# Patient Record
Sex: MALE | Race: WHITE
[De-identification: names, ages, dates, MRNs, and addresses within clinical notes are randomized per-mention and may not be internally consistent; named-entity substitution may affect disease eponyms.]

---

## 2020-11-16 ENCOUNTER — HOSPITAL ENCOUNTER (EMERGENCY)
Dept: HOSPITAL 97 - ER | Age: 60
Discharge: HOME | End: 2020-11-16
Payer: COMMERCIAL

## 2020-11-16 VITALS — SYSTOLIC BLOOD PRESSURE: 132 MMHG | DIASTOLIC BLOOD PRESSURE: 70 MMHG | OXYGEN SATURATION: 100 %

## 2020-11-16 VITALS — TEMPERATURE: 98.2 F

## 2020-11-16 DIAGNOSIS — M62.830: Primary | ICD-10-CM

## 2020-11-16 PROCEDURE — 96372 THER/PROPH/DIAG INJ SC/IM: CPT

## 2020-11-16 PROCEDURE — 99283 EMERGENCY DEPT VISIT LOW MDM: CPT

## 2020-11-16 NOTE — ER
Nurse's Notes                                                                                     

 [facility]                                                                 

Name: Yosef Pickard                                                                                

Age: 60 yrs                                                                                       

Sex: Male                                                                                         

: 1960                                                                                   

MRN: [id number]                                                                                   

Arrival Date: 2020                                                                          

Time: 08:34                                                                                       

Account#: [id number]                                                                            

Bed 8                                                                                             

Private MD:                                                                                       

Diagnosis: Low back pain;Muscle spasm of back                                                     

                                                                                                  

Presentation:                                                                                     

                                                                                             

08:50 Chief complaint: Patient states: reports of left lower back pain that started           vg1 

      yesterday, 11/15/2020.                                                                      

08:50 Method Of Arrival: Ambulatory                                                           vg1 

08:50 Coronavirus screen: Client denies travel out of the [place] in the last 14 days. Ebola     vg1 

      Screen: Patient negative for fever greater than or equal to 101.5 degrees Fahrenheit,       

      and additional compatible Ebola Virus Disease symptoms. Initial Sepsis Screen: Does the     

      patient meet any 2 criteria? No. Patient's initial sepsis screen is negative. Does the      

      patient have a suspected source of infection? No. Patient's initial sepsis screen is        

      negative. Risk Assessment: Do you want to hurt yourself or someone else? Patient            

      reports no desire to harm self or others. Onset of symptoms was November 15, 2020.          

08:50 Acuity: JARETT 4                                                                           vg1 

                                                                                                  

- Family history:: not pertinent.                                                                 

- Social history:: Smoking status: Patient denies any tobacco usage or history of.                

- Hospitalizations: : No recent hospitalization is reported.                                      

                                                                                                  

                                                                                                  

Screenin:55 Abuse screen: Denies threats or abuse. Nutritional screening: No deficits noted.        vg1 

      Tuberculosis screening: No symptoms or risk factors identified. Fall Risk No fall in        

      past 12 months (0 pts). No secondary diagnosis (0 pts). No IV (0 pts). Ambulatory Aid-      

      None/Bed Rest/Nurse Assist (0 pts). Gait- Normal/Bed Rest/Wheelchair (0 pts) Mental         

      Status- Oriented to own ability (0 pts). Total Queen Fall Scale indicates No Risk (0-24     

      pts).                                                                                       

                                                                                                  

Assessment:                                                                                       

08:55 General: Appears in no apparent distress. Behavior is calm, cooperative. Pain:          vg1 

      Complains of pain in Left lower back Pain currently is 5 out of 10 on a pain scale. at      

      worst was 8 out of 10 on a pain scale. Pain began 1 day ago. Is continuous. Neuro:          

      Level of Consciousness is awake, alert, obeys commands, Oriented to person, place,          

      time, situation. Cardiovascular: Patient's skin is warm and dry. Respiratory: Airway is     

      patent Respiratory effort is even, unlabored, Respiratory pattern is regular,               

      symmetrical. GI: No signs and/or symptoms were reported involving the gastrointestinal      

      system. : No signs and/or symptoms were reported regarding the genitourinary system.      

      EENT: No signs and/or symptoms were reported regarding the EENT system. Derm: Skin is       

      pink, warm \T\ dry. Musculoskeletal: Range of motion: intact in all extremities.            

                                                                                                  

Vital Signs:                                                                                      

09:00  / 82; Pulse 50; Resp 18; Temp 98.2(TE); Pulse Ox 95% on R/A;                     vg1 

09:30  / 70; Pulse 53; Resp 18; Pulse Ox 100% on R/A;                                   vg1 

09:00 Patient stated 'I've always had a low heart rate'.                                      vg1 

                                                                                                  

ED Course:                                                                                        

08:34 Patient arrived in ED.                                                                  ds1 

08:55 Stephy Dasilva, RN is Primary Nurse.                                                  vg1 

08:55 Patient has correct armband on for positive identification. Placed in gown. Bed in low  vg1 

      position. Call light in reach. Pulse ox on. NIBP on. Door closed. Warm blanket given.       

08:58 Elbert Velasquez MD is Attending Physician.                                                rn  

09:15 Triage completed.                                                                       vg1 

09:36 No provider procedures requiring assistance completed. Patient did not have IV access   vg1 

      during this emergency room visit.                                                           

                                                                                                  

Administered Medications:                                                                         

09:18 CANCELLED (Physician Discretion): Decadron - Dexamethasone 10 mg IVP once               vg1 

09:34 Drug: Decadron 10 mg Route: IM; Site: Ventrogluteal RIGHT;                              vg1 

09:35 Follow up: Response: Medication administered at discharge.                              vg1 

                                                                                                  

                                                                                                  

Outcome:                                                                                          

09:10 Discharge ordered by MD.                                                                rn  

09:36 Discharged to home ambulatory.                                                          vg1 

09:36 Condition: stable                                                                           

09:36 Discharge instructions given to patient, Instructed on discharge instructions, follow       

      up and referral plans. medication usage, Demonstrated understanding of instructions,        

      follow-up care, medications, Prescriptions given X 2.                                       

09:37 Patient left the ED.                                                                    vg1 

                                                                                                  

Signatures:                                                                                       

Amparo Handy                                ds1                                                  

Elbert Velasquez MD MD rn Garcia, Victoria, RN RN   vg1                                                  

                                                                                                  

Corrections: (The following items were deleted from the chart)                                    

09:10 09:00  / 82; Pulse 50bpm; Resp 18bpm; Pulse Ox 95% RA; Patient stated 'I've       vg1 

      always had a low heart rate'.; vg1                                                          

                                                                                                  

**************************************************************************************************

## 2020-11-16 NOTE — EDPHYS
Physician Documentation                                                                           

 [facility]                                                                 

Name: Yosef Pickard                                                                                

Age: 60 yrs                                                                                       

Sex: Male                                                                                         

: 1960                                                                                   

MRN: [id number]                                                                                   

Arrival Date: 2020                                                                          

Time: 08:34                                                                                       

Account#: [id number]                                                                            

Bed 8                                                                                             

Private MD:                                                                                       

ED Physician Elbert Velasquez                                                                         

HPI:                                                                                              

                                                                                             

09:03 This 60 yrs old  Male presents to ER via Unassigned with complaints of Back    rn  

      Pain.                                                                                       

09:03 The patient presents with pain that is chronic, with no known mechanism of injury. The  rn  

      symptoms are located in the low back.                                                       

09:03 Onset: The symptoms/episode began/occurred yesterday. The pain does not radiate.        rn  

      Associated signs and symptoms: Pertinent positives: none Pertinent negatives: abdominal     

      pain, dysuria, hematuria, incontinence, nausea, numbness, tingling, urinary retention,      

      vomiting, weakness. The problem was sustained without known cause. Modifying factors:       

      The patient symptoms are alleviated by remaining still, the patient symptoms are            

      aggravated by any movement, bending. Severity of symptoms: At their worst the symptoms      

      were moderate, in the emergency department the symptoms are unchanged. The patient has      

      experienced similar episodes in the past. The patient has not recently seen a               

      physician. Reports back pain, no acute injury, has had back pain since , no surgery,     

      reports intermittent back problems, improves with steroid injections and muscle             

      relaxers. No bowel/bladder issues. no weakness of legs. + left lower back pain. .           

                                                                                                  

- Family history:: not pertinent.                                                                 

- Social history:: Smoking status: Patient denies any tobacco usage or history of.                

- Hospitalizations: : No recent hospitalization is reported.                                      

                                                                                                  

                                                                                                  

ROS:                                                                                              

09:03 Constitutional: Negative for fever, chills, and weight loss, Abdomen/GI: Negative for   rn  

      abdominal pain, nausea, vomiting, diarrhea, and constipation, Back: Negative for            

      injury. + pain to left lower back : Negative for injury, bleeding, discharge, and         

      swelling, MS/Extremity: Negative for injury and deformity, Neuro: Negative for              

      headache, weakness, numbness, tingling, and seizure.                                        

                                                                                                  

Exam:                                                                                             

09:03 Constitutional:  This is a well developed, well nourished patient who is awake, alert,  rn  

      and in no acute distress. Back:  No spinal tenderness. + mild tenderness left lower         

      back. No skin changes.  Skin:  Warm, dry MS/ Extremity:  Pulses equal, no cyanosis.         

      Neurovascular intact.  Neuro:  Awake and alert,  Motor strength 5/5 in all extremities.     

       Sensory grossly intact.                                                                    

                                                                                                  

Vital Signs:                                                                                      

09:00  / 82; Pulse 50; Resp 18; Temp 98.2(TE); Pulse Ox 95% on R/A;                     vg1 

09:30  / 70; Pulse 53; Resp 18; Pulse Ox 100% on R/A;                                   vg1 

09:00 Patient stated 'I've always had a low heart rate'.                                      vg1 

                                                                                                  

MDM:                                                                                              

08:58 Patient medically screened.                                                             rn  

09:03 Differential diagnosis: chronic back pain, muscle spasm, radiculopathy. Data reviewed:  rn  

      vital signs, nurses notes, and as a result, I will discharge patient. Counseling: I had     

      a detailed discussion with the patient and/or guardian regarding: the historical            

      points, exam findings, and any diagnostic results supporting the discharge/admit            

      diagnosis, the need for outpatient follow up, to return to the emergency department if      

      symptoms worsen or persist or if there are any questions or concerns that arise at home.    

                                                                                                  

Administered Medications:                                                                         

:18 CANCELLED (Physician Discretion): Decadron - Dexamethasone 10 mg IVP once               vg1 

09:34 Drug: Decadron 10 mg Route: IM; Site: Ventrogluteal RIGHT;                              vg1 

09:35 Follow up: Response: Medication administered at discharge.                              vg1 

                                                                                                  

                                                                                                  

Disposition:                                                                                      

20 09:10 Discharged to Home. Impression: Low back pain, Muscle spasm of back.               

- Condition is Stable.                                                                            

- Discharge Instructions: Back Pain, Adult, Muscle Cramps and Spasms, Back Injury                 

  Prevention, Easy-to-Read, Back Exercises, Easy-to-Read.                                         

- Prescriptions for Cyclobenzaprine 10 mg Oral Tablet - take 1 tablet by ORAL route               

  every 8 hours As needed; 15 tablet. Medrol (William) 4 mg Oral Tablets, Dose Pack - take            

  1 tablet by ORAL route as directed - follow package instructions; 1 packet.                     

- Medication Reconciliation Form, Thank You Letter, Antibiotic Education, Prescription            

  Opioid Use form.                                                                                

- Follow up: Private Physician; When: As needed; Reason: Recheck today's complaints,              

  Re-evaluation by your physician.                                                                

- Problem is an acute exacerbation.                                                               

- Symptoms have improved.                                                                         

                                                                                                  

                                                                                                  

                                                                                                  

Signatures:                                                                                       

Elbert Velasquez MD MD rn Garcia, Victoria, RN                    RN   vg1                                                  

                                                                                                  

Corrections: (The following items were deleted from the chart)                                    

09:18 09:09 Decadron - Dexamethasone 10 mg IVP once ordered. rn                               vg1 

09:37 09:10 2020 09:10 Discharged to Home. Impression: Low back pain; Muscle spasm of   vg1 

      back. Condition is Stable. Forms are Medication Reconciliation Form, Thank You Letter,      

      Antibiotic Education, Prescription Opioid Use. Follow up: Private Physician; When: As       

      needed; Reason: Recheck today's complaints, Re-evaluation by your physician. Problem is     

      an acute exacerbation. Symptoms have improved. rn                                           

                                                                                                  

**************************************************************************************************